# Patient Record
Sex: MALE | Race: WHITE
[De-identification: names, ages, dates, MRNs, and addresses within clinical notes are randomized per-mention and may not be internally consistent; named-entity substitution may affect disease eponyms.]

---

## 2017-03-02 ENCOUNTER — HOSPITAL ENCOUNTER (OUTPATIENT)
Dept: HOSPITAL 17 - CPRE | Age: 72
End: 2017-03-02
Attending: NEUROLOGICAL SURGERY
Payer: MEDICARE

## 2017-03-02 DIAGNOSIS — Z79.01: ICD-10-CM

## 2017-03-02 DIAGNOSIS — Z01.810: ICD-10-CM

## 2017-03-02 DIAGNOSIS — S32.000A: ICD-10-CM

## 2017-03-02 DIAGNOSIS — N39.0: ICD-10-CM

## 2017-03-02 DIAGNOSIS — Z01.812: Primary | ICD-10-CM

## 2017-03-02 DIAGNOSIS — R94.31: ICD-10-CM

## 2017-03-02 DIAGNOSIS — I44.4: ICD-10-CM

## 2017-03-02 DIAGNOSIS — Z01.811: ICD-10-CM

## 2017-03-02 DIAGNOSIS — B96.1: ICD-10-CM

## 2017-03-02 LAB
ALP SERPL-CCNC: 94 U/L (ref 45–117)
ALT SERPL-CCNC: 19 U/L (ref 12–78)
ANION GAP SERPL CALC-SCNC: 8 MEQ/L (ref 5–15)
APTT BLD: 27.2 SEC (ref 24.3–30.1)
AST SERPL-CCNC: 13 U/L (ref 15–37)
BACTERIA #/AREA URNS HPF: (no result) /HPF
BASOPHILS # BLD AUTO: 0 TH/MM3 (ref 0–0.2)
BASOPHILS NFR BLD: 0.6 % (ref 0–2)
BILIRUB SERPL-MCNC: 0.4 MG/DL (ref 0.2–1)
BUN SERPL-MCNC: 21 MG/DL (ref 7–18)
CHLORIDE SERPL-SCNC: 102 MEQ/L (ref 98–107)
COLOR UR: YELLOW
COMMENT (UR): (no result)
CULTURE IF INDICATED: (no result)
EOSINOPHIL # BLD: 0.3 TH/MM3 (ref 0–0.4)
EOSINOPHIL NFR BLD: 6.3 % (ref 0–4)
ERYTHROCYTE [DISTWIDTH] IN BLOOD BY AUTOMATED COUNT: 13.1 % (ref 11.6–17.2)
GFR SERPLBLD BASED ON 1.73 SQ M-ARVRAT: 77 ML/MIN (ref 89–?)
GLUCOSE UR STRIP-MCNC: (no result) MG/DL
HCO3 BLD-SCNC: 30 MEQ/L (ref 21–32)
HCT VFR BLD CALC: 42.3 % (ref 39–51)
HEMO FLAGS: (no result)
HGB UR QL STRIP: (no result)
INR PPP: 0.9 RATIO
KETONES UR STRIP-MCNC: (no result) MG/DL
LYMPHOCYTES # BLD AUTO: 0.6 TH/MM3 (ref 1–4.8)
LYMPHOCYTES NFR BLD AUTO: 11.6 % (ref 9–44)
MCH RBC QN AUTO: 31.9 PG (ref 27–34)
MCHC RBC AUTO-ENTMCNC: 34.5 % (ref 32–36)
MCV RBC AUTO: 92.3 FL (ref 80–100)
MONOCYTES NFR BLD: 11.2 % (ref 0–8)
MUCOUS THREADS #/AREA URNS LPF: (no result) /LPF
NEUTROPHILS # BLD AUTO: 3.7 TH/MM3 (ref 1.8–7.7)
NEUTROPHILS NFR BLD AUTO: 70.3 % (ref 16–70)
NITRITE UR QL STRIP: (no result)
PLATELET # BLD: 216 TH/MM3 (ref 150–450)
POTASSIUM SERPL-SCNC: 4.2 MEQ/L (ref 3.5–5.1)
PROTHROMBIN TIME: 9.7 SEC (ref 9.8–11.6)
RBC # BLD AUTO: 4.58 MIL/MM3 (ref 4.5–5.9)
SODIUM SERPL-SCNC: 140 MEQ/L (ref 136–145)
SP GR UR STRIP: 1.02 (ref 1–1.03)
SQUAMOUS #/AREA URNS HPF: <1 /HPF (ref 0–5)
WBC # BLD AUTO: 5.2 TH/MM3 (ref 4–11)

## 2017-03-02 PROCEDURE — 93005 ELECTROCARDIOGRAM TRACING: CPT

## 2017-03-02 PROCEDURE — 81001 URINALYSIS AUTO W/SCOPE: CPT

## 2017-03-02 PROCEDURE — 36415 COLL VENOUS BLD VENIPUNCTURE: CPT

## 2017-03-02 PROCEDURE — 80053 COMPREHEN METABOLIC PANEL: CPT

## 2017-03-02 PROCEDURE — 85025 COMPLETE CBC W/AUTO DIFF WBC: CPT

## 2017-03-02 PROCEDURE — 87077 CULTURE AEROBIC IDENTIFY: CPT

## 2017-03-02 PROCEDURE — 85730 THROMBOPLASTIN TIME PARTIAL: CPT

## 2017-03-02 PROCEDURE — 87186 SC STD MICRODIL/AGAR DIL: CPT

## 2017-03-02 PROCEDURE — 85610 PROTHROMBIN TIME: CPT

## 2017-03-02 PROCEDURE — 87086 URINE CULTURE/COLONY COUNT: CPT

## 2017-03-02 PROCEDURE — 71020: CPT

## 2017-03-02 NOTE — RADRPT
EXAM DATE/TIME:  03/02/2017 12:22 

 

[facility] COMPARISON:     

No previous studies available for comparison.

 

                     

INDICATIONS :     

Evaluate for pneumonia, pneumothora and communicable diseases. Pre-op back surgery

                     

 

MEDICAL HISTORY :     

None.          

 

SURGICAL HISTORY :     

None.   

 

ENCOUNTER:     

Initial                                        

 

ACUITY:     

1 day      

 

PAIN SCORE:     

0/10

 

LOCATION:      

chest 

 

FINDINGS:     

PA and lateral views of the chest demonstrate the lungs to be symmetrically aerated without evidence 
of mass, infiltrate or effusion.  The cardiomediastinal contours are unremarkable.  Osseous structure
s are intact.

 

CONCLUSION:     No acute disease.  

 

 

 

 Don Witt MD on March 02, 2017 at 13:14           

Board Certified Radiologist.

 This report was verified electronically.

## 2017-03-06 ENCOUNTER — HOSPITAL ENCOUNTER (OUTPATIENT)
Dept: HOSPITAL 17 - HSDC | Age: 72
Discharge: HOME | End: 2017-03-06
Attending: NEUROLOGICAL SURGERY
Payer: MEDICARE

## 2017-03-06 VITALS — WEIGHT: 187.39 LBS | HEIGHT: 70 IN | BODY MASS INDEX: 26.83 KG/M2

## 2017-03-06 VITALS
RESPIRATION RATE: 16 BRPM | TEMPERATURE: 98 F | SYSTOLIC BLOOD PRESSURE: 142 MMHG | DIASTOLIC BLOOD PRESSURE: 82 MMHG | HEART RATE: 85 BPM | OXYGEN SATURATION: 99 %

## 2017-03-06 VITALS
SYSTOLIC BLOOD PRESSURE: 143 MMHG | TEMPERATURE: 97.2 F | OXYGEN SATURATION: 99 % | RESPIRATION RATE: 16 BRPM | HEART RATE: 65 BPM | DIASTOLIC BLOOD PRESSURE: 75 MMHG

## 2017-03-06 DIAGNOSIS — S32.050A: Primary | ICD-10-CM

## 2017-03-06 PROCEDURE — 22514 PERQ VERTEBRAL AUGMENTATION: CPT

## 2017-03-06 PROCEDURE — 72100 X-RAY EXAM L-S SPINE 2/3 VWS: CPT

## 2017-03-06 PROCEDURE — 01936: CPT

## 2017-03-06 NOTE — RADRPT
EXAM DATE/TIME:  03/06/2017 09:25 

 

[facility] COMPARISON:     

No previous studies available for comparison.

 

                     

INDICATIONS :     

Post-op L5 kyphoplasty.

                     

 

MEDICAL HISTORY :     

None.          

 

SURGICAL HISTORY :     

None.   

 

ENCOUNTER:     

Initial                                        

 

ACUITY:     

1 day      

 

PAIN SCORE:     

Non-responsive.

 

LOCATION:       

Lumbar spine.

 

FINDINGS:     

There is evidence of previous kyphoplasty at L5. No extravasation of cement is seen.

 

CONCLUSION:     

1. Postsurgical changes as above.

 

 

 

 Mikal Domingo MD on March 06, 2017 at 12:49           

Board Certified Radiologist.

 This report was verified electronically.

## 2017-06-26 ENCOUNTER — HOSPITAL ENCOUNTER (EMERGENCY)
Dept: HOSPITAL 17 - PHED | Age: 72
Discharge: HOME | End: 2017-06-26
Payer: MEDICARE

## 2017-06-26 VITALS
OXYGEN SATURATION: 97 % | DIASTOLIC BLOOD PRESSURE: 78 MMHG | SYSTOLIC BLOOD PRESSURE: 146 MMHG | RESPIRATION RATE: 18 BRPM | HEART RATE: 87 BPM

## 2017-06-26 VITALS
DIASTOLIC BLOOD PRESSURE: 84 MMHG | SYSTOLIC BLOOD PRESSURE: 167 MMHG | HEART RATE: 82 BPM | OXYGEN SATURATION: 98 % | RESPIRATION RATE: 16 BRPM

## 2017-06-26 VITALS — WEIGHT: 185.41 LBS | HEIGHT: 69 IN | BODY MASS INDEX: 27.46 KG/M2

## 2017-06-26 VITALS
HEART RATE: 82 BPM | DIASTOLIC BLOOD PRESSURE: 74 MMHG | RESPIRATION RATE: 16 BRPM | SYSTOLIC BLOOD PRESSURE: 146 MMHG | OXYGEN SATURATION: 98 %

## 2017-06-26 VITALS
HEART RATE: 92 BPM | RESPIRATION RATE: 18 BRPM | DIASTOLIC BLOOD PRESSURE: 89 MMHG | TEMPERATURE: 98 F | OXYGEN SATURATION: 98 % | SYSTOLIC BLOOD PRESSURE: 156 MMHG

## 2017-06-26 DIAGNOSIS — N20.0: Primary | ICD-10-CM

## 2017-06-26 LAB
ANION GAP SERPL CALC-SCNC: 8 MEQ/L (ref 5–15)
BASOPHILS # BLD AUTO: 0 TH/MM3 (ref 0–0.2)
BASOPHILS NFR BLD: 0.5 % (ref 0–2)
BUN SERPL-MCNC: 22 MG/DL (ref 7–18)
CHLORIDE SERPL-SCNC: 104 MEQ/L (ref 98–107)
COLOR UR: YELLOW
COMMENT (UR): (no result)
CULTURE IF INDICATED: (no result)
EOSINOPHIL # BLD: 0.1 TH/MM3 (ref 0–0.4)
EOSINOPHIL NFR BLD: 1 % (ref 0–4)
ERYTHROCYTE [DISTWIDTH] IN BLOOD BY AUTOMATED COUNT: 12.6 % (ref 11.6–17.2)
GFR SERPLBLD BASED ON 1.73 SQ M-ARVRAT: 50 ML/MIN (ref 89–?)
GLUCOSE UR STRIP-MCNC: (no result) MG/DL
HCO3 BLD-SCNC: 26.5 MEQ/L (ref 21–32)
HCT VFR BLD CALC: 43.3 % (ref 39–51)
HEMO FLAGS: (no result)
HGB UR QL STRIP: (no result)
KETONES UR STRIP-MCNC: (no result) MG/DL
LYMPHOCYTES # BLD AUTO: 0.5 TH/MM3 (ref 1–4.8)
LYMPHOCYTES NFR BLD AUTO: 6.9 % (ref 9–44)
MCH RBC QN AUTO: 30.6 PG (ref 27–34)
MCHC RBC AUTO-ENTMCNC: 33.1 % (ref 32–36)
MCV RBC AUTO: 92.4 FL (ref 80–100)
METHOD OF COLLECTION: (no result)
MONOCYTES NFR BLD: 11.4 % (ref 0–8)
NEUTROPHILS # BLD AUTO: 5.8 TH/MM3 (ref 1.8–7.7)
NEUTROPHILS NFR BLD AUTO: 80.2 % (ref 16–70)
NITRITE UR QL STRIP: (no result)
PLATELET # BLD: 231 TH/MM3 (ref 150–450)
POTASSIUM SERPL-SCNC: 3.9 MEQ/L (ref 3.5–5.1)
RBC # BLD AUTO: 4.69 MIL/MM3 (ref 4.5–5.9)
RBC #/AREA URNS HPF: (no result) /HPF (ref 0–3)
SODIUM SERPL-SCNC: 138 MEQ/L (ref 136–145)
SP GR UR STRIP: 1.01 (ref 1–1.03)
SQUAMOUS #/AREA URNS HPF: (no result) /HPF (ref 0–5)
WBC # BLD AUTO: 7.2 TH/MM3 (ref 4–11)

## 2017-06-26 PROCEDURE — 96374 THER/PROPH/DIAG INJ IV PUSH: CPT

## 2017-06-26 PROCEDURE — 72131 CT LUMBAR SPINE W/O DYE: CPT

## 2017-06-26 PROCEDURE — 96361 HYDRATE IV INFUSION ADD-ON: CPT

## 2017-06-26 PROCEDURE — 81001 URINALYSIS AUTO W/SCOPE: CPT

## 2017-06-26 PROCEDURE — 80048 BASIC METABOLIC PNL TOTAL CA: CPT

## 2017-06-26 PROCEDURE — 85025 COMPLETE CBC W/AUTO DIFF WBC: CPT

## 2017-06-26 PROCEDURE — 74176 CT ABD & PELVIS W/O CONTRAST: CPT

## 2017-06-26 PROCEDURE — 99285 EMERGENCY DEPT VISIT HI MDM: CPT

## 2017-06-26 NOTE — RADRPT
EXAM DATE/TIME:  06/26/2017 13:36 

 

[facility] COMPARISON:     

SPINE LUMBAR LTD (AP & LAT), March 06, 2017, 9:25.

 

 

INDICATIONS :     

Right flank pain since yesterday. 

                      

 

RADIATION DOSE:       

; Reconstructed from previous dataset

 

 

 

MEDICAL HISTORY :     

Carcinoma, prostate.  Anticoagulant therapy.

 

SURGICAL HISTORY :       

Hernia repair. Orthopedic surgery.

 

ENCOUNTER:      

Initial

 

ACUITY:      

2 days

 

PAIN SCALE:      

8/10

 

LOCATION:         

spine

 

TECHNIQUE:     

Volumetric scanning of the lumbar spine was performed.  Multiplanar reconstructions in the sagittal, 
coronal and oblique axial planes were performed.  Using automated exposure control and adjustment of 
the mA and/or kV according to patient size, radiation dose was kept as low as reasonably achievable t
o obtain optimal diagnostic quality images.   DICOM format image data is available electronically for
 review and comparison.  

 

FINDINGS:       

 

VERTEBRAE:     

There is a compression fracture of the L1 vertebral body that is of uncertain chronicity. However, no
 acute fracture line is clearly visualized. There is approximately 50% height loss centrally. There i
s a superior endplate compression fracture of L5 that has been previously treated with kyphoplasty. A
 small amount of cement material is present within the L4-L5 disc space. There is mild height loss at
 the inferior plate of L4. Endplate osteophytes are present. No acute fracture is seen.

 

ALIGNMENT:     

There is no anterolisthesis or retrolisthesis. There is kyphotic hyperangulation at the T12-L1 second
macy to the L1 compression fracture.

 

 

T12-L1:  

There is posterior osteophytic ridging. No disc herniation, canal stenosis, or neural foraminal steno
sis is identified.

 

L1-L2:    

There is diffuse posterior osteophytic ridging with facet hypertrophy. No definite canal stenosis is 
identified. There is at least mild neural foraminal narrowing bilaterally.

 

L2-L3:    

There is a diffuse disc bulge with facet hypertrophy. There may be mild narrowing of the spinal canal
 but the canal is not well-visualized. There is mild to moderate neural foraminal stenosis bilaterall
y.

 

L3-L4:    

There is a mild diffuse disc bulge with facet hypertrophy. No canal stenosis is present. There is mil
d neural foraminal narrowing bilaterally.

 

L4-L5: 

There is diffuse posterior osteophytic ridging with moderate facet hypertrophy. There is at least mil
d spinal canal stenosis present and there is moderate neural foraminal narrowing bilaterally, left gr
eater than right.

 

L5-S1:    

There is mild facet hypertrophy. No disc herniation or canal stenosis is identified. There is moderat
e neural foraminal narrowing bilaterally.

 

Please refer to abdomen and pelvis CT for description of the surrounding findings.

 

CONCLUSION:     

1. There is a compression fracture of L1 with associated T12-L1 kyphosis. This fracture is of uncerta
in chronicity but suspected to be chronic given the appearance. No acute fracture line is visualized.


2. Chronic compression fracture the superior endplate of L5 has been treated with kyphoplasty.

3. There is a moderate severity multilevel degenerative disc disease. There are multiple areas of stephanie
ral foraminal narrowing and canal stenosis at L4-L5.

 

 

 

 Emmanuel Hawthorne MD on June 26, 2017 at 14:29           

Board Certified Radiologist.

 This report was verified electronically.

## 2017-06-26 NOTE — RADRPT
EXAM DATE/TIME:  06/26/2017 13:36 

 

[facility] COMPARISON:     

No previous studies available for comparison.

 

 

INDICATIONS :     

Right flank pain since yesterday. Evaluate for renal stone. Painful urination.

                  

 

ORAL CONTRAST:      

No oral contrast ingested.

                  

 

RADIATION DOSE:     

13.00 CTDIvol (mGy) 

 

 

MEDICAL HISTORY :     

Carcinoma, prostate.  Anticoagulant therapy.

 

SURGICAL HISTORY :       

Hernia repair. Orthopedic surgery.

 

ENCOUNTER:      

Initial

 

ACUITY:      

2 days

 

PAIN SCALE:      

8/10

 

LOCATION:       

Right flank 

 

TECHNIQUE:     

Volumetric scanning of the abdomen and pelvis was performed.  Using automated exposure control and ad
justment of the mA and/or kV according to patient size, radiation dose was kept as low as reasonably 
achievable to obtain optimal diagnostic quality images.  DICOM format image data is available electro
nically for review and comparison.  

 

FINDINGS:     

 

LOWER LUNGS:     

The visualized lower lungs are clear.

 

LIVER:     

Visualized portions are grossly unremarkable.

 

SPLEEN:     

Visualized portions grossly unremarkable.

 

PANCREAS:     

Within normal limits. 

 

KIDNEYS:     

Mild right hydronephrosis and hydroureter down to the level of repair of stones in the distal right u
reter, each measuring about 3 mm in size just proximal to the ureterovesical junction. Miniscule nono
bstructing stone in the medial upper pole collecting system of the right kidney. Tiny upper and mid p
ole stones in addition to couple small lateral lower pole stone is on the left side. Left renal parap
elvic cysts without definite hydronephrosis or hydroureter.

 

ADRENAL GLANDS:     

Within normal limits.

 

VASCULAR:     

There is no aortic aneurysm.

 

BOWEL/MESENTERY:     

The stomach, small bowel, and colon demonstrate no acute abnormality.  There is no free intraperitone
al air or fluid. 

 

ABDOMINAL WALL:     

Within normal limits.

 

RETROPERITONEUM:     

There is no lymphadenopathy.

 

BLADDER:     

No wall thickening or mass.

 

REPRODUCTIVE:     

Within normal limits.

 

INGUINAL:     

Previous right inguinal mesh hernia repair. No mass or adenopathy.

 

MUSCULOSKELETAL:     

Previous lumbar kyphoplasty. Degenerative changes..

 

CONCLUSION:     

Small distal right ureteral stones with mild hydronephrosis and hydroureter

 

 

 

 Emmanuel Johnson MD on June 26, 2017 at 13:59           

Board Certified Radiologist.

 This report was verified electronically.

## 2017-06-26 NOTE — PD
HPI


Chief Complaint:  Flank/Kidney Pain


Time Seen by Provider:  13:15


Travel History


International Travel<30 days:  No


Contact w/Intl Traveler<30days:  No


Traveled to known affect area:  No





History of Present Illness


HPI


Patient is a 72-year-old male with a history of compression fracture L1 by an 

MRI performed in February of this year as well as a kyphoplasty of L5 in March 

of this year presents emergency Department with right flank pain and dysuria 

since last night.  Patient states the pain was fairly intense last month and 

eased up and then came back this morning.  Denies any fever denies any vomiting 

but has had some mild nausea.  States she's never had symptoms like this before 

but his recent CAT scans for workup his back he did notice some nonobstructing 

kidney stones in his kidney.  Denies any diarrhea blood in the stool or 

constipation.





PFSH


Past Medical History


Hx Anticoagulant Therapy:  Yes (ASA)


Cancer:  Yes (prostate, completed radiation 2016)


Cardiovascular Problems:  No


Diabetes:  No


Diminished Hearing:  No


Endocrine:  No


Genitourinary:  No


Hepatitis:  No


Hiatal Hernia:  No


Immune Disorder:  No


Musculoskeletal:  Yes (pain in back and l hip)


Neurologic:  No


Psychiatric:  No


Reproductive:  No


Respiratory:  No


Immunizations Current:  Yes


Thyroid Disease:  No





Past Surgical History


Abdominal Surgery:  Yes (hernia repair)


AICD:  No


Genitourinary Surgery:  Yes (prostate)


Joint Replacement:  No


Pacemaker:  No


Other Surgery:  Yes (LEFT THUMB TENDON REPAIR)





Social History


Alcohol Use:  Yes (OCCASIONAL)


Tobacco Use:  No


Substance Use:  No





Allergies-Medications


(Allergen,Severity, Reaction):  


Coded Allergies:  


     No Known Allergies (Verified , 6/26/17)


Reported Meds & Prescriptions





Reported Meds & Active Scripts


Active


Flomax (Tamsulosin HCl) 0.4 Mg Cap 0.4 Mg PO HS


Zofran Odt (Ondansetron Odt) 4 Mg Tab 4 Mg SL Q6HR PRN


Lortab (Hydrocodone-Acetaminophen) 5-325 Mg Tab 1 Tab PO Q8HR PRN


Reported


Calcium Carbonate 1,250 Mg Tab 1,000 Mg PO BID


     1,250 mg calcium carbonate (500 mg elemental calcium)


Aspirin 81 Mg Chew 81 Mg CHEW EVERY OTHER DAY


Zantac (Ranitidine HCl) 150 Mg Tab 150 Mg PO DAILY


Multi-Vitamin Daily (Multiple Vitamin) 1 Tab Tab 1 Tab PO DAILY


Viagra (Sildenafil Citrate) 50 Mg Tab 50 Mg PO DAILY PRN


Omega-3 Fish Oil/Vitamin (Fish Oil-Cholecalciferol) 1,000-1,000 Mg Cap 1 Cap PO 

DAILY








Review of Systems


Except as stated in HPI:  all other systems reviewed are Neg





Physical Exam


Narrative


GENERAL:  Well-developed well-nourished no apparent distress.


SKIN: Focused skin assessment warm/dry.


HEAD: Atraumatic. Normocephalic. 


EYES: Pupils equal and round. No scleral icterus. No injection or drainage. 


ENT: No nasal bleeding or discharge.  Mucous membranes pink and moist.


NECK: Trachea midline. No JVD. 


CARDIOVASCULAR: Regular rate and rhythm.  No murmur appreciated.


RESPIRATORY: No accessory muscle use. Clear to auscultation. Breath sounds 

equal bilaterally. 


GASTROINTESTINAL: Abdomen soft, non-tender, nondistended. Hepatic and splenic 

margins not palpable.  Minimal CVA tenderness on the right side.


MUSCULOSKELETAL: No obvious deformities. No clubbing.  No cyanosis.  No edema. 


NEUROLOGICAL: Awake and alert. No obvious cranial nerve deficits.  Motor 

grossly within normal limits. Normal speech.


PSYCHIATRIC: Appropriate mood and affect; insight and judgment normal.





Data


Data


Last Documented VS





Vital Signs








  Date Time  Temp Pulse Resp B/P Pulse Ox O2 Delivery O2 Flow Rate FiO2


 


6/26/17 15:26  82 16 146/74 98 Room Air  


 


6/26/17 12:57 98.0       








Orders





 Urinalysis - C+S If Indicated (6/26/17 13:02)


Basic Metabolic Panel (Bmp) (6/26/17 13:21)


Complete Blood Count With Diff (6/26/17 13:21)


Ct Abd/Pel W/O Iv Contrast (6/26/17 13:21)


Iv Access Insert/Monitor (6/26/17 13:21)


Ecg Monitoring (6/26/17 13:21)


Oximetry (6/26/17 13:21)


Sodium Chlor 0.9% 1000 Ml Inj (Ns 1000 M (6/26/17 13:21)


Sodium Chloride 0.9% Flush (Ns Flush) (6/26/17 13:30)


Ketorolac Inj (Toradol Inj) (6/26/17 13:30)


Ct Lumb Spine W/O Contrast (6/26/17 )





Labs





 Laboratory Tests








Test 6/26/17 6/26/17





 13:00 13:20


 


Urine Collection Type CLEAN CATCH  


 


Urine Color YELLOW  


 


Urine Turbidity CLEAR  


 


Urine pH 7.0  


 


Urine Specific Gravity 1.015  


 


Urine Protein NEG mg/dL 


 


Urine Glucose (UA) NEG mg/dL 


 


Urine Ketones NEG mg/dL 


 


Urine Occult Blood TRACE  


 


Urine Nitrite NEG  


 


Urine Bilirubin NEG  


 


Urine Leukocyte Esterase NEG  


 


Urine RBC 0-3 /hpf 


 


Urine Squamous Epithelial 0-5 /hpf 





Cells  


 


Urine Amorphous Sediment FEW  


 


Microscopic Urinalysis Comment CULT NOT 





 INDICATED 


 


Urine Collection Time 1300  


 


White Blood Count  7.2 TH/MM3


 


Red Blood Count  4.69 MIL/MM3


 


Hemoglobin  14.4 GM/DL


 


Hematocrit  43.3 %


 


Mean Corpuscular Volume  92.4 FL


 


Mean Corpuscular Hemoglobin  30.6 PG


 


Mean Corpuscular Hemoglobin  33.1 %





Concent  


 


Red Cell Distribution Width  12.6 %


 


Platelet Count  231 TH/MM3


 


Mean Platelet Volume  6.6 FL


 


Neutrophils (%) (Auto)  80.2 %


 


Lymphocytes (%) (Auto)  6.9 %


 


Monocytes (%) (Auto)  11.4 %


 


Eosinophils (%) (Auto)  1.0 %


 


Basophils (%) (Auto)  0.5 %


 


Neutrophils # (Auto)  5.8 TH/MM3


 


Lymphocytes # (Auto)  0.5 TH/MM3


 


Monocytes # (Auto)  0.8 TH/MM3


 


Eosinophils # (Auto)  0.1 TH/MM3


 


Basophils # (Auto)  0.0 TH/MM3


 


CBC Comment  DIFF FINAL 


 


Differential Comment   


 


Sodium Level  138 MEQ/L


 


Potassium Level  3.9 MEQ/L


 


Chloride Level  104 MEQ/L


 


Carbon Dioxide Level  26.5 MEQ/L


 


Anion Gap  8 MEQ/L


 


Blood Urea Nitrogen  22 MG/DL


 


Creatinine  1.40 MG/DL


 


Estimat Glomerular Filtration  50 ML/MIN





Rate  


 


Random Glucose  105 MG/DL


 


Calcium Level  9.0 MG/DL











[facility]


Medical Decision Making


Medical Screen Exam Complete:  Yes


Emergency Medical Condition:  Yes


Differential Diagnosis


Kidney stones, acute back injury, urinary tract infection, pyelonephritis.


Narrative Course


Patient was roomed in the emergency department, review of his records shows 

indeed he did have an MRI in February of this year showing a compression 

fracture of L1.  He's had a kyphoplasty by Dr. Max in March of this year.  

There is no midline lumbar tenderness and really minimal CVA tenderness.  Need 

to consider bony as well as kidney sources of his pain.  To this and a 

noncontrast CT of his abdomen as well as his back was obtained:








Last 24 hours Impressions








Abdomen/Pelvis CT 6/26/17 1321 Signed





Impressions: 





 Service Date/Time:  Monday, June 26, 2017 13:36 - CONCLUSION:  Small distal 





 right ureteral stones with mild hydronephrosis and hydroureter     Emmanuel Johnson MD 


 


Lumbar Spine CT 6/26/17 0000 Signed





Impressions: 





 Service Date/Time:  Monday, June 26, 2017 13:36 - CONCLUSION:  1. There is a 





 compression fracture of L1 with associated T12-L1 kyphosis. This fracture is 

of 





 uncertain chronicity but suspected to be chronic given the appearance. No 

acute 





 fracture line is visualized. 2. Chronic compression fracture the superior 





 endplate of L5 has been treated with kyphoplasty. 3. There is a moderate 





 severity multilevel degenerative disc disease. There are multiple areas of 





 neural foraminal narrowing and canal stenosis at L4-L5.     Emmanuel Hawthorne MD 





Patient does have minimal elevation of his creatinine to 1.4, last was 1.0 but 

has been variable in the past.  Urine is noninfected white blood cell count 

normal, he was given Toradol in the emergency department is feeling better.  

Discussed with him needs follow-up with urologist within a week and he is 

agreeable.  He has urologist and Dr. Mcconnell has followed him for a 

prostatectomy in the past.  After discussion of risks benefits competitions and 

alternatives of Flomax therapy he is agreeable for a trial.  Discussed 

symptomatic management and return to ED criteria.  He states that he has a 

supply of hydrocodone at home which he takes very sparingly and has enough to 

help them at home.





Diagnosis





 Primary Impression:  


 Kidney stone


***Med/Other Pt SpecificInfo:  Prescription(s) given


Scripts


Tamsulosin (Flomax)0.4 Mg Cap0.4 Mg PO HS  #30 CAP  Ref 0


   Prov:Luis Carrasco MD         6/26/17 


Ondansetron Odt (Zofran Odt)4 Mg Tab4 Mg SL Q6HR PRN (Nausea/Vomiting) #30 TAB  

Ref 0


   Prov:Luis Carrasco MD         6/26/17


Disposition:  01 DISCHARGE HOME


Condition:  Stable








Luis Carrasco MD Jun 26, 2017 13:23

## 2018-03-01 ENCOUNTER — HOSPITAL ENCOUNTER (EMERGENCY)
Dept: HOSPITAL 17 - PHED | Age: 73
Discharge: HOME | End: 2018-03-01
Payer: MEDICARE

## 2018-03-01 VITALS — BODY MASS INDEX: 26.7 KG/M2 | WEIGHT: 186.51 LBS | HEIGHT: 70 IN

## 2018-03-01 VITALS
OXYGEN SATURATION: 97 % | SYSTOLIC BLOOD PRESSURE: 158 MMHG | DIASTOLIC BLOOD PRESSURE: 82 MMHG | RESPIRATION RATE: 16 BRPM | HEART RATE: 102 BPM | TEMPERATURE: 98.6 F

## 2018-03-01 DIAGNOSIS — Z79.82: ICD-10-CM

## 2018-03-01 DIAGNOSIS — J20.9: Primary | ICD-10-CM

## 2018-03-01 DIAGNOSIS — Z85.46: ICD-10-CM

## 2018-03-01 PROCEDURE — 99283 EMERGENCY DEPT VISIT LOW MDM: CPT

## 2018-03-01 PROCEDURE — 71046 X-RAY EXAM CHEST 2 VIEWS: CPT

## 2018-03-01 PROCEDURE — 94664 DEMO&/EVAL PT USE INHALER: CPT

## 2018-03-01 NOTE — PD
HPI


Chief Complaint:  Respiratory Symptoms


Time Seen by Provider:  08:36


Travel History


International Travel<30 days:  No


Contact w/Intl Traveler<30days:  No


Traveled to known affect area:  No





History of Present Illness


HPI


This 72-year-old male is complaining of persistent cough.  He started getting 

sick earlier in the week.  On Monday and Tuesday he was having coughing he had 

a fever of 102.  He went to an urgent care center and was put on Augmentin and 

a Medrol Dosepak.  He has 2 days left of his prescriptions.  His fever has 

resolved but he has persistent cough.  He feels like he has phlegm which is 

unable to get up.  He has no history of asthma.  He has never smoked.  He has 

no history of lung disease.  He has had a compression fracture of his lumbar 

spine has been treated for prostate cancer.  He has not been short of breath





PFSH


Past Medical History


Hx Anticoagulant Therapy:  Yes (asa 81mg)


Cancer:  Yes (prostate, completed radiation 2016)


Cardiovascular Problems:  No


Diabetes:  No


Diminished Hearing:  No


Endocrine:  No


Gastrointestinal Disorders:  Yes (ileus after a prior surgery)


Genitourinary:  No


Hepatitis:  No


Hiatal Hernia:  No


Hypertension:  No


Immune Disorder:  No


Musculoskeletal:  Yes (pain in back and l hip)


Neurologic:  No


Psychiatric:  No


Reproductive:  No


Respiratory:  No


Immunizations Current:  Yes


Radiation Therapy:  Yes


Thyroid Disease:  No


Influenza Vaccination:  No





Past Surgical History


Abdominal Surgery:  Yes (hernia repair)


AICD:  No


Genitourinary Surgery:  Yes (prostate)


Joint Replacement:  No


Pacemaker:  No


Other Surgery:  Yes (LEFT THUMB TENDON REPAIR)





Social History


Alcohol Use:  Yes (OCCASIONAL)


Tobacco Use:  No


Substance Use:  No





Allergies-Medications


(Allergen,Severity, Reaction):  


Coded Allergies:  


     No Known Allergies (Verified  Adverse Reaction, Unknown, 3/1/18)


Reported Meds & Prescriptions





Reported Meds & Active Scripts


Active


Reported


Ventolin Hfa 18 GM Inh (Albuterol Sulfate) 90 Mcg/Act Aer 2 Puff INH Q4-6H PRN


Augmentin (Amoxicillin-Clavulanate) 875-125 Mg Tab 1 Tab PO BID


Calcium Carbonate 1,250 Mg Tab 1,000 Mg PO BID


     1,250 mg calcium carbonate (500 mg elemental calcium)


Aspirin 81 Mg Chew 81 Mg CHEW EVERY OTHER DAY


Zantac (Ranitidine HCl) 150 Mg Tab 150 Mg PO DAILY


Multi-Vitamin Daily (Multiple Vitamin) 1 Tab Tab 1 Tab PO DAILY


Viagra (Sildenafil Citrate) 50 Mg Tab 50 Mg PO DAILY PRN








Review of Systems


General / Constitutional:  Positive: Fever, No: Chills


Eyes:  No: Diploplia, Blurred Vision


HENT:  No: Headaches, Vertigo


Cardiovascular:  Positive: Chest Pain or Discomfort (With cough), No: 

Palpitations


Respiratory:  Positive: Cough, Wheezing


Gastrointestinal:  No: Nausea, Vomiting


Genitourinary:  No: Urgency, Frequency


Musculoskeletal:  No: Myalgias


Skin:  No Rash


Neurologic:  No: Weakness, Dizziness


Psychiatric:  No: Anxiety, Depression


Endocrine:  No: Cold Intolerance


Hematologic/Lymphatic:  No: Easy Bruising





Physical Exam


Narrative


GENERAL: Male


SKIN: Focused skin assessment warm/dry.


HEAD: Atraumatic. Normocephalic. 


EYES: Pupils equal and round. No scleral icterus. No injection or drainage. 


ENT: No nasal bleeding or discharge.  Mucous membranes pink and moist.


NECK: Trachea midline. No JVD. 


CARDIOVASCULAR: Regular rate and rhythm.  No murmur appreciated.


RESPIRATORY: No accessory muscle use.  There is an occasional expiratory wheeze 

that clears with cough. Breath sounds equal bilaterally. 


GASTROINTESTINAL: Abdomen soft, non-tender, nondistended. Hepatic and splenic 

margins not palpable. 


MUSCULOSKELETAL: No obvious deformities. No clubbing.  No cyanosis.  No edema. 


NEUROLOGICAL: Awake and alert. No obvious cranial nerve deficits.  Motor 

grossly within normal limits. Normal speech.


PSYCHIATRIC: Appropriate mood and affect; insight and judgment normal.





Data


Data


Last Documented VS





Vital Signs








  Date Time  Temp Pulse Resp B/P (MAP) Pulse Ox O2 Delivery O2 Flow Rate FiO2


 


3/1/18 08:30 98.6 102 16 158/82 (107) 97   








Orders





 Orders


Chest, Pa & Lat (3/1/18 08:46)


Albuterol-Ipratropium Neb (Duoneb Neb) (3/1/18 09:00)








MDM


Medical Decision Making


Medical Screen Exam Complete:  Yes


Emergency Medical Condition:  Yes


Medical Record Reviewed:  Yes


Differential Diagnosis


Differential includes pneumonia, bronchitis, space-occupying lesion


Narrative Course


Chest x-ray is negative.  He was given a nebulizer treatment which seemed to 

help a little bit.  Impression is bronchitis.  I have encouraged him to drink 

lots of fluids.  I will prescribe albuterol as that seems to help him





Diagnosis





 Primary Impression:  


 Acute bronchitis


Scripts


Albuterol 18 GM Inh (Ventolin Hfa 18 GM Inh) 90 Mcg/Act Aer


2 PUFF INH Q4-6H Y for SHORTNESS OF BREATH, #1 INHALER 0 Refills


   Prov: Tha Busby MD         3/1/18


Disposition:  01 DISCHARGE HOME


Condition:  Stable











Tha Busby MD Mar 1, 2018 08:51

## 2018-03-01 NOTE — RADRPT
EXAM DATE/TIME:  03/01/2018 09:15 

 

[facility] COMPARISON:     

No previous studies available for comparison.

 

                     

INDICATIONS :     

Cough, congestion, fever x 6 days.

                     

 

MEDICAL HISTORY :     

Carcinoma, prostatic.       Ileus. Radiation therapy.   

 

SURGICAL HISTORY :     

Kyphoplasty.   Knee arthroscopy. Hernia repair.

 

ENCOUNTER:     

Initial                                        

 

ACUITY:     

4 - 6 days      

 

PAIN SCORE:     

4/10

 

LOCATION:      

chest 

 

FINDINGS:     

PA and lateral views of the chest demonstrate the lungs to be symmetrically aerated without evidence 
of mass, infiltrate or effusion.  The cardiomediastinal contours are unremarkable.  Osseous structure
s are intact. Chronic wedging of the upper lumbar spine

 

CONCLUSION:     Normal examination.  

 

 

 

 Moe Mckay MD on March 01, 2018 at 9:24           

Board Certified Radiologist.

 This report was verified electronically.

## 2019-03-12 NOTE — EKG
Date Performed: 03/02/2017       Time Performed: 11:42:04

 

PTAGE:      71 years

 

EKG:      Sinus rhythm 

 

 PATTERN CONSISTENT WITH PULMONARY DISEASE LEFT ANTERIOR FASCICULAR BLOCK ABNORMAL ECG 

 

NO PREVIOUS TRACING            

 

DOCTOR:   Roxanne Francois  Interpretating Date/Time  03/03/2017 23:38:58 89M with hx of CVA, htn recent admission to hospital for UTI was recovering at facility when patient had fever, cough, blood in sputum. 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 Pt has been increasingly somnolent, hx limited due to pt's acuity. pt upgraded to me for concern for sepsis. Blood tinged sputum only.